# Patient Record
Sex: FEMALE | Race: OTHER | HISPANIC OR LATINO | URBAN - METROPOLITAN AREA
[De-identification: names, ages, dates, MRNs, and addresses within clinical notes are randomized per-mention and may not be internally consistent; named-entity substitution may affect disease eponyms.]

---

## 2020-02-06 ENCOUNTER — NEW PATIENT (OUTPATIENT)
Dept: URBAN - METROPOLITAN AREA CLINIC 32 | Facility: CLINIC | Age: 25
End: 2020-02-06

## 2020-02-06 DIAGNOSIS — H43.393: ICD-10-CM

## 2020-02-06 DIAGNOSIS — H40.023: ICD-10-CM

## 2020-02-06 DIAGNOSIS — H35.463: ICD-10-CM

## 2020-02-06 PROCEDURE — 92201 OPSCPY EXTND RTA DRAW UNI/BI: CPT

## 2020-02-06 PROCEDURE — 99244 OFF/OP CNSLTJ NEW/EST MOD 40: CPT

## 2020-02-06 PROCEDURE — 92134 CPTRZ OPH DX IMG PST SGM RTA: CPT

## 2020-02-06 ASSESSMENT — TONOMETRY
OD_IOP_MMHG: 18
OS_IOP_MMHG: 14

## 2020-02-06 ASSESSMENT — VISUAL ACUITY
OS_SC: 20/16
OD_SC: 20/20

## 2022-07-25 ENCOUNTER — HOSPITAL ENCOUNTER (EMERGENCY)
Facility: HOSPITAL | Age: 27
Discharge: HOME | End: 2022-07-25
Attending: EMERGENCY MEDICINE | Admitting: EMERGENCY MEDICINE
Payer: COMMERCIAL

## 2022-07-25 VITALS
TEMPERATURE: 101.1 F | DIASTOLIC BLOOD PRESSURE: 72 MMHG | HEART RATE: 100 BPM | WEIGHT: 109.6 LBS | SYSTOLIC BLOOD PRESSURE: 131 MMHG | OXYGEN SATURATION: 98 % | BODY MASS INDEX: 17.61 KG/M2 | HEIGHT: 66 IN | RESPIRATION RATE: 20 BRPM

## 2022-07-25 DIAGNOSIS — F41.9 ANXIETY: ICD-10-CM

## 2022-07-25 DIAGNOSIS — R50.9 FEVER: Primary | ICD-10-CM

## 2022-07-25 DIAGNOSIS — R68.84 JAW PAIN: ICD-10-CM

## 2022-07-25 PROCEDURE — 99283 EMERGENCY DEPT VISIT LOW MDM: CPT

## 2022-07-25 PROCEDURE — 90715 TDAP VACCINE 7 YRS/> IM: CPT | Performed by: EMERGENCY MEDICINE

## 2022-07-25 PROCEDURE — 90471 IMMUNIZATION ADMIN: CPT

## 2022-07-25 PROCEDURE — 99284 EMERGENCY DEPT VISIT MOD MDM: CPT | Performed by: EMERGENCY MEDICINE

## 2022-07-25 RX ORDER — HYDROXYZINE 50 MG/1
50 TABLET, FILM COATED ORAL 3 TIMES DAILY PRN
Qty: 20 TABLET | Refills: 0 | Status: SHIPPED | OUTPATIENT
Start: 2022-07-25

## 2022-07-25 RX ORDER — ACETAMINOPHEN 325 MG/1
650 TABLET ORAL ONCE
Status: COMPLETED | OUTPATIENT
Start: 2022-07-25 | End: 2022-07-25

## 2022-07-25 RX ADMIN — TETANUS TOXOID, REDUCED DIPHTHERIA TOXOID AND ACELLULAR PERTUSSIS VACCINE, ADSORBED 0.5 ML: 5; 2.5; 8; 8; 2.5 SUSPENSION INTRAMUSCULAR at 20:05

## 2022-07-25 RX ADMIN — ACETAMINOPHEN 650 MG: 325 TABLET, FILM COATED ORAL at 19:39

## 2022-07-25 NOTE — ED PROVIDER NOTES
History  Chief Complaint   Patient presents with    Fever - 9 weeks to 74 years     Patient has fever of 101 1, not aware , coughing, asked to wear mask correctly, states she was at [facility] and was with covid patients  Has pain right ear  Rambling   28 yo female is concerned that she has tetanus  She has been in [facility] for the last week  She says she had a positive covid test there one week ago  At that time she was having a cough  5 days ago while in [facility] she cut her left forearm with a eye pen sharpener  That day she developed muscle spasms and says they were giving her BZD  Today she developed right jaw pain and she looked on WebMD and it said she had tetanus  She denies tooth pain  She just finished course of Bactrim while in [facility] for a skin infection on her left lower leg  No vomiting, diarrhea  She has had normal childhood immunizations she says  Pt  Did not know she had a fever  History provided by:  Patient   used: No        Prior to Admission Medications   Prescriptions Last Dose Informant Patient Reported? Taking? UNKNOWN TO PATIENT   Yes Yes   Sig: Stopped the [facility] that was prescribed recently      Facility-Administered Medications: None       Past Medical History:   Diagnosis Date    Psychiatric disorder        Past Surgical History:   Procedure Laterality Date    NASAL SEPTUM SURGERY         No family history on file  I have reviewed and agree with the history as documented  E-Cigarette/Vaping    E-Cigarette Use Never User      E-Cigarette/Vaping Substances     Social History     Tobacco Use    Smoking status: Former Smoker    Smokeless tobacco: Never Used   Vaping Use    Vaping Use: Never used   Substance Use Topics    Alcohol use: Not Currently    Drug use: Yes     Types: Marijuana     Comment: socially       Review of Systems   Constitutional: Positive for fever  HENT: Negative  Eyes: Negative      Respiratory: Positive for cough  Negative for shortness of breath  Cardiovascular: Negative  Negative for chest pain  Gastrointestinal: Negative  Negative for abdominal pain, diarrhea, nausea and vomiting  Genitourinary: Negative  Negative for dysuria and flank pain  Musculoskeletal: Positive for myalgias  Negative for back pain  Skin: Negative  Negative for rash  Neurological: Negative  Negative for dizziness and headaches  Hematological: Does not bruise/bleed easily  Psychiatric/Behavioral: Positive for self-injury  The patient is nervous/anxious  All other systems reviewed and are negative  Physical Exam  Physical Exam  Vitals and nursing note reviewed  Constitutional:       General: She is not in acute distress  Appearance: She is well-developed  She is not ill-appearing or diaphoretic  HENT:      Head: Normocephalic and atraumatic  Right Ear: Tympanic membrane and external ear normal       Left Ear: Tympanic membrane and external ear normal       Mouth/Throat:      Mouth: Mucous membranes are moist       Pharynx: Oropharynx is clear  Comments: Opens closes mouth easily, speaking easily  Eyes:      General: No scleral icterus  Conjunctiva/sclera: Conjunctivae normal    Cardiovascular:      Rate and Rhythm: Normal rate and regular rhythm  Heart sounds: Normal heart sounds  No murmur heard  Pulmonary:      Effort: Pulmonary effort is normal  No respiratory distress  Breath sounds: Normal breath sounds  Abdominal:      General: There is no distension  Palpations: Abdomen is soft  Musculoskeletal:         General: No deformity  Normal range of motion  Cervical back: Normal range of motion and neck supple  Right lower leg: No edema  Left lower leg: No edema  Comments: Moving around easily, no muscle rigidity or spasms noted   Skin:     General: Skin is warm and dry  Coloration: Skin is not pale  Findings: No rash        Comments: Very superficial small scratch noted underside mid left forearm, not red/swollen/fluctuant   Neurological:      General: No focal deficit present  Mental Status: She is alert and oriented to person, place, and time  Cranial Nerves: No cranial nerve deficit  Motor: No weakness  Gait: Gait normal    Psychiatric:         Mood and Affect: Mood normal          Behavior: Behavior normal          Vital Signs  ED Triage Vitals [07/25/22 1834]   Temperature Pulse Respirations Blood Pressure SpO2   (!) 101 1 °F (38 4 °C) 100 20 131/72 98 %      Temp Source Heart Rate Source Patient Position - Orthostatic VS BP Location FiO2 (%)   Tympanic Monitor Sitting Left arm --      Pain Score       --           Vitals:    07/25/22 1834   BP: 131/72   Pulse: 100   Patient Position - Orthostatic VS: Sitting         Visual Acuity      ED Medications  Medications   tetanus-diphtheria-acellular pertussis (BOOSTRIX) IM injection 0 5 mL (has no administration in time range)   acetaminophen (TYLENOL) tablet 650 mg (650 mg Oral Given 7/25/22 1939)       Diagnostic Studies  Results Reviewed     None                 No orders to display              Procedures  Procedures         ED Course                                             MDM  Number of Diagnoses or Management Options  Diagnosis management comments: Signs and symptoms of tetanus infection reviewed on Up to Date  Pt 's exam is benign and there are no visible symptoms c/w tetanus  Unclear if fever is still from recent covid infection  She denies toothache and she just finished abx course  Will update her tetanus booster and advised observe at home for any worsening symptoms        Disposition  Final diagnoses:   Fever   Jaw pain   Anxiety     Time reflects when diagnosis was documented in both MDM as applicable and the Disposition within this note     Time User Action Codes Description Comment    3/82/1763  5:85 PM Carlos Fuller Add [V85 0] Fever     7/25/2022 7:86 PM Christine Miller Add [W22 69] Jaw pain     4/16/5877  7:93 PM Christine Miller Add [F82 4] Anxiety       ED Disposition     ED Disposition   Discharge    Condition   Stable    Date/Time   Mon Jul 25, 2022  8:02 PM    Comment   Asa Angelucci discharge to home/self care  Follow-up Information    None         Patient's Medications   Discharge Prescriptions    HYDROXYZINE HCL (ATARAX) 50 MG TABLET    Take 1 tablet (50 mg total) by mouth 3 (three) times a day as needed for anxiety       Start Date: 7/25/2022 End Date: --       Order Dose: 50 mg       Quantity: 20 tablet    Refills: 0       No discharge procedures on file      PDMP Review     None          ED Provider  Electronically Signed by           Geni Salgado MD  35/46/21 [id number]

## 2022-07-26 NOTE — DISCHARGE INSTRUCTIONS
It is unlikely that you have tetanus infection and you are not exhibiting physical signs of it at this time  The fever can be from covid still  The jaw pain can be from stress or TMJ inflammation or toothache  Lets see what happens with all this over the next few days  If you continue with fevers or develop cough, vomiting, diarrhea, toothache, urinary symptoms or muscle rigidity please return to the ER for recheck